# Patient Record
Sex: MALE | Race: WHITE
[De-identification: names, ages, dates, MRNs, and addresses within clinical notes are randomized per-mention and may not be internally consistent; named-entity substitution may affect disease eponyms.]

---

## 2017-03-29 ENCOUNTER — HOSPITAL ENCOUNTER (EMERGENCY)
Dept: HOSPITAL 47 - EC | Age: 27
Discharge: HOME | End: 2017-03-29
Payer: COMMERCIAL

## 2017-03-29 VITALS — DIASTOLIC BLOOD PRESSURE: 81 MMHG | SYSTOLIC BLOOD PRESSURE: 149 MMHG | HEART RATE: 105 BPM | TEMPERATURE: 98 F

## 2017-03-29 VITALS — RESPIRATION RATE: 18 BRPM

## 2017-03-29 DIAGNOSIS — Z79.899: ICD-10-CM

## 2017-03-29 DIAGNOSIS — F10.129: ICD-10-CM
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
## 2017-03-29 DIAGNOSIS — F17.200: ICD-10-CM

## 2017-03-29 DIAGNOSIS — F43.21: Primary | ICD-10-CM

## 2017-03-29 PROCEDURE — 80306 DRUG TEST PRSMV INSTRMNT: CPT

## 2017-03-29 PROCEDURE — 99285 EMERGENCY DEPT VISIT HI MDM: CPT
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
## 2018-12-05 ENCOUNTER — HOSPITAL ENCOUNTER (EMERGENCY)
Dept: HOSPITAL 47 - EC | Age: 28
Discharge: HOME | End: 2018-12-05
Payer: COMMERCIAL

## 2018-12-05 VITALS — HEART RATE: 92 BPM | SYSTOLIC BLOOD PRESSURE: 128 MMHG | TEMPERATURE: 97 F | DIASTOLIC BLOOD PRESSURE: 74 MMHG

## 2018-12-05 VITALS — RESPIRATION RATE: 18 BRPM

## 2018-12-05 DIAGNOSIS — Z79.899: ICD-10-CM

## 2018-12-05 DIAGNOSIS — L02.31: Primary | ICD-10-CM

## 2018-12-05 DIAGNOSIS — F32.9: ICD-10-CM

## 2018-12-05 DIAGNOSIS — F17.200: ICD-10-CM

## 2018-12-05 PROCEDURE — 87205 SMEAR GRAM STAIN: CPT

## 2018-12-05 PROCEDURE — 96372 THER/PROPH/DIAG INJ SC/IM: CPT

## 2018-12-05 PROCEDURE — 10060 I&D ABSCESS SIMPLE/SINGLE: CPT

## 2018-12-05 PROCEDURE — 87070 CULTURE OTHR SPECIMN AEROBIC: CPT

## 2018-12-05 PROCEDURE — 99283 EMERGENCY DEPT VISIT LOW MDM: CPT
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
